# Patient Record
Sex: FEMALE | Race: OTHER | HISPANIC OR LATINO | URBAN - METROPOLITAN AREA
[De-identification: names, ages, dates, MRNs, and addresses within clinical notes are randomized per-mention and may not be internally consistent; named-entity substitution may affect disease eponyms.]

---

## 2022-07-09 ENCOUNTER — TELEPHONE ENCOUNTER (OUTPATIENT)
Dept: URBAN - METROPOLITAN AREA CLINIC 121 | Facility: CLINIC | Age: 72
End: 2022-07-09

## 2022-07-09 RX ORDER — FAMOTIDINE 10 MG
TABLET ORAL TWICE A DAY
Refills: 0 | OUTPATIENT
Start: 2019-08-22 | End: 2019-11-12

## 2022-07-09 RX ORDER — INSULIN LISPRO 100 [IU]/ML
40 UNIT 3 TIMES A DAY INJECTION, SOLUTION INTRAVENOUS; SUBCUTANEOUS
Refills: 0 | OUTPATIENT
Start: 2019-08-22 | End: 2019-08-22

## 2022-07-09 RX ORDER — CLOPIDOGREL 75 MG/1
TABLET ORAL
Refills: 0 | OUTPATIENT
Start: 2019-06-17 | End: 2019-06-25

## 2022-07-09 RX ORDER — LINACLOTIDE 145 UG/1
CAPSULE, GELATIN COATED ORAL
Refills: 0 | OUTPATIENT
Start: 2019-08-22 | End: 2019-11-12

## 2022-07-09 RX ORDER — ARIPIPRAZOLE 10 MG/1
TABLET ORAL
Refills: 0 | OUTPATIENT
Start: 2019-06-25 | End: 2019-08-22

## 2022-07-09 RX ORDER — INSULIN DETEMIR 100 [IU]/ML
40 UNIT HS INJECTION, SOLUTION SUBCUTANEOUS
Refills: 0 | OUTPATIENT
Start: 2019-11-12 | End: 2020-02-10

## 2022-07-09 RX ORDER — LINACLOTIDE 145 UG/1
CAPSULE, GELATIN COATED ORAL
Refills: 0 | OUTPATIENT
Start: 2019-06-25 | End: 2019-08-22

## 2022-07-09 RX ORDER — INSULIN LISPRO 100 [IU]/ML
40 UNIT 3 TIMES A DAY INJECTION, SOLUTION INTRAVENOUS; SUBCUTANEOUS
Refills: 0 | OUTPATIENT
Start: 2019-01-28 | End: 2019-06-25

## 2022-07-09 RX ORDER — INSULIN GLARGINE 300 U/ML
20 UNIT DAILY INJECTION, SOLUTION SUBCUTANEOUS
Refills: 0 | OUTPATIENT
Start: 2019-08-22 | End: 2019-08-22

## 2022-07-09 RX ORDER — ARIPIPRAZOLE 10 MG/1
TABLET ORAL
Refills: 0 | OUTPATIENT
Start: 2019-08-22 | End: 2019-11-12

## 2022-07-09 RX ORDER — INSULIN DETEMIR 100 [IU]/ML
40 UNIT HS INJECTION, SOLUTION SUBCUTANEOUS
Refills: 0 | OUTPATIENT
Start: 2019-01-28 | End: 2019-08-22

## 2022-07-09 RX ORDER — METFORMIN HCL 500 MG/1
TABLET ORAL THREE TIMES A DAY
Refills: 0 | OUTPATIENT
Start: 2019-04-30 | End: 2019-06-25

## 2022-07-09 RX ORDER — INSULIN LISPRO 100 [IU]/ML
20 UNIT QID INJECTION, SOLUTION INTRAVENOUS; SUBCUTANEOUS
Refills: 0 | OUTPATIENT
Start: 2019-08-22 | End: 2019-11-12

## 2022-07-09 RX ORDER — DULOXETINE 30 MG/1
CAPSULE, DELAYED RELEASE PELLETS ORAL
Refills: 0 | OUTPATIENT
Start: 2019-06-25 | End: 2019-08-22

## 2022-07-09 RX ORDER — PREDNISOLONE ACETATE 10 MG/ML
SUSPENSION/ DROPS OPHTHALMIC
Refills: 0 | OUTPATIENT
Start: 2019-06-12 | End: 2019-11-12

## 2022-07-09 RX ORDER — INSULIN DETEMIR 100 [IU]/ML
40 UNIT HS INJECTION, SOLUTION SUBCUTANEOUS
Refills: 0 | OUTPATIENT
Start: 2019-08-22 | End: 2019-11-12

## 2022-07-09 RX ORDER — BROMFENAC SODIUM 0.7 MG/ML
SOLUTION/ DROPS OPHTHALMIC
Refills: 0 | OUTPATIENT
Start: 2019-06-12 | End: 2019-11-12

## 2022-07-09 RX ORDER — INSULIN GLARGINE 300 U/ML
20 UNIT DAILY INJECTION, SOLUTION SUBCUTANEOUS
Refills: 0 | OUTPATIENT
Start: 2019-06-25 | End: 2019-08-22

## 2022-07-09 RX ORDER — FAMOTIDINE 10 MG
TABLET ORAL TWICE A DAY
Refills: 0 | OUTPATIENT
Start: 2019-03-26 | End: 2019-06-25

## 2022-07-09 RX ORDER — LINACLOTIDE 145 UG/1
CAPSULE, GELATIN COATED ORAL
Refills: 0 | OUTPATIENT
Start: 2019-05-22 | End: 2019-06-25

## 2022-07-09 RX ORDER — FAMOTIDINE 10 MG
TABLET ORAL TWICE A DAY
Refills: 0 | OUTPATIENT
Start: 2019-06-25 | End: 2019-08-22

## 2022-07-09 RX ORDER — INSULIN LISPRO 100 [IU]/ML
20 UNIT QID INJECTION, SOLUTION INTRAVENOUS; SUBCUTANEOUS
Refills: 0 | OUTPATIENT
Start: 2019-01-28 | End: 2019-08-22

## 2022-07-09 RX ORDER — PANTOPRAZOLE SODIUM 40 MG/1
TABLET, DELAYED RELEASE ORAL
Refills: 0 | OUTPATIENT
Start: 2019-06-25 | End: 2019-08-22

## 2022-07-09 RX ORDER — DULOXETINE 30 MG/1
CAPSULE, DELAYED RELEASE PELLETS ORAL
Refills: 0 | OUTPATIENT
Start: 2019-08-22 | End: 2019-11-12

## 2022-07-09 RX ORDER — CLOPIDOGREL 75 MG/1
TABLET ORAL
Refills: 0 | OUTPATIENT
Start: 2019-08-22 | End: 2019-11-12

## 2022-07-09 RX ORDER — INSULIN LISPRO 100 [IU]/ML
40 UNIT 3 TIMES A DAY INJECTION, SOLUTION INTRAVENOUS; SUBCUTANEOUS
Refills: 0 | OUTPATIENT
Start: 2019-06-25 | End: 2019-08-22

## 2022-07-09 RX ORDER — PANTOPRAZOLE SODIUM 40 MG/1
TABLET, DELAYED RELEASE ORAL
Refills: 0 | OUTPATIENT
Start: 2019-08-22 | End: 2019-11-12

## 2022-07-09 RX ORDER — DULOXETINE 30 MG/1
CAPSULE, DELAYED RELEASE PELLETS ORAL
Refills: 0 | OUTPATIENT
Start: 2019-04-18 | End: 2019-06-25

## 2022-07-09 RX ORDER — CLOPIDOGREL 75 MG/1
TABLET ORAL
Refills: 0 | OUTPATIENT
Start: 2019-06-25 | End: 2019-08-22

## 2022-07-09 RX ORDER — METFORMIN HCL 500 MG/1
TABLET ORAL THREE TIMES A DAY
Refills: 0 | OUTPATIENT
Start: 2019-06-25 | End: 2019-08-22

## 2022-07-09 RX ORDER — METFORMIN HCL 500 MG/1
TABLET ORAL THREE TIMES A DAY
Refills: 0 | OUTPATIENT
Start: 2019-08-22 | End: 2019-11-12

## 2022-07-10 ENCOUNTER — TELEPHONE ENCOUNTER (OUTPATIENT)
Dept: URBAN - METROPOLITAN AREA CLINIC 121 | Facility: CLINIC | Age: 72
End: 2022-07-10

## 2022-07-10 RX ORDER — PANTOPRAZOLE SODIUM 40 MG/1
ONCE A DAY TABLET, DELAYED RELEASE ORAL ONCE A DAY
Refills: 2 | Status: ACTIVE | COMMUNITY
Start: 2020-02-10

## 2022-07-10 RX ORDER — FAMOTIDINE 10 MG
TABLET ORAL TWICE A DAY
Refills: 0 | Status: ACTIVE | COMMUNITY
Start: 2019-11-12

## 2022-07-10 RX ORDER — LINACLOTIDE 145 UG/1
CAPSULE, GELATIN COATED ORAL
Refills: 0 | Status: ACTIVE | COMMUNITY
Start: 2019-11-12

## 2022-07-10 RX ORDER — METFORMIN HCL 500 MG/1
TABLET ORAL THREE TIMES A DAY
Refills: 0 | Status: ACTIVE | COMMUNITY
Start: 2019-11-12

## 2022-07-10 RX ORDER — SUCRALFATE 1 G/1
THREE TIMES A DAY TABLET ORAL THREE TIMES A DAY
Refills: 2 | Status: ACTIVE | COMMUNITY
Start: 2020-02-10

## 2022-07-10 RX ORDER — BROMFENAC SODIUM 0.7 MG/ML
SOLUTION/ DROPS OPHTHALMIC
Refills: 0 | Status: ACTIVE | COMMUNITY
Start: 2019-11-12

## 2022-07-10 RX ORDER — ARIPIPRAZOLE 10 MG/1
TABLET ORAL
Refills: 0 | Status: ACTIVE | COMMUNITY
Start: 2019-11-12

## 2022-07-10 RX ORDER — PANTOPRAZOLE SODIUM 40 MG/1
TABLET, DELAYED RELEASE ORAL
Refills: 0 | Status: ACTIVE | COMMUNITY
Start: 2019-11-12

## 2022-07-10 RX ORDER — PANTOPRAZOLE 20 MG/1
ONCE A DAY TABLET, DELAYED RELEASE ORAL ONCE A DAY
Refills: 0 | Status: ACTIVE | COMMUNITY
Start: 2019-11-12

## 2022-07-10 RX ORDER — INSULIN LISPRO 100 [IU]/ML
40 UNIT 3 TIMES A DAY INJECTION, SOLUTION INTRAVENOUS; SUBCUTANEOUS
Refills: 0 | Status: ACTIVE | COMMUNITY
Start: 2019-11-12

## 2022-07-10 RX ORDER — INSULIN DETEMIR 100 [IU]/ML
INJECTION, SOLUTION SUBCUTANEOUS TWICE A DAY
Refills: 0 | Status: ACTIVE | COMMUNITY
Start: 2020-02-06

## 2022-07-10 RX ORDER — DULOXETINE 30 MG/1
CAPSULE, DELAYED RELEASE PELLETS ORAL
Refills: 0 | Status: ACTIVE | COMMUNITY
Start: 2019-11-12

## 2022-07-10 RX ORDER — ASPIRIN 81 MG/1
TABLET, COATED ORAL
Refills: 0 | Status: ACTIVE | COMMUNITY
Start: 2019-11-12

## 2022-07-30 ENCOUNTER — TELEPHONE ENCOUNTER (OUTPATIENT)
Age: 72
End: 2022-07-30

## 2022-07-31 ENCOUNTER — TELEPHONE ENCOUNTER (OUTPATIENT)
Age: 72
End: 2022-07-31

## 2023-04-19 ENCOUNTER — OFFICE VISIT (OUTPATIENT)
Dept: URBAN - METROPOLITAN AREA CLINIC 60 | Facility: CLINIC | Age: 73
End: 2023-04-19
Payer: COMMERCIAL

## 2023-04-19 ENCOUNTER — DASHBOARD ENCOUNTERS (OUTPATIENT)
Age: 73
End: 2023-04-19

## 2023-04-19 VITALS
BODY MASS INDEX: 31.18 KG/M2 | DIASTOLIC BLOOD PRESSURE: 78 MMHG | OXYGEN SATURATION: 98 % | SYSTOLIC BLOOD PRESSURE: 128 MMHG | WEIGHT: 176 LBS | HEIGHT: 63 IN | HEART RATE: 78 BPM | RESPIRATION RATE: 20 BRPM | TEMPERATURE: 97 F

## 2023-04-19 DIAGNOSIS — K59.04 CHRONIC IDIOPATHIC CONSTIPATION: ICD-10-CM

## 2023-04-19 PROBLEM — 82934008: Status: ACTIVE | Noted: 2023-04-19

## 2023-04-19 PROCEDURE — 99204 OFFICE O/P NEW MOD 45 MIN: CPT | Performed by: NURSE PRACTITIONER

## 2023-04-19 RX ORDER — INSULIN LISPRO 100 [IU]/ML
AS DIRECTED INJECTION, SOLUTION INTRAVENOUS; SUBCUTANEOUS
Status: ACTIVE | COMMUNITY

## 2023-04-19 RX ORDER — LINACLOTIDE 145 UG/1
1 CAPSULE AT LEAST 30 MINUTES BEFORE THE FIRST MEAL OF THE DAY ON AN EMPTY STOMACH CAPSULE, GELATIN COATED ORAL ONCE A DAY
Qty: 90 CAPSULES | Refills: 0 | OUTPATIENT
Start: 2023-04-19 | End: 2023-07-18

## 2023-04-19 RX ORDER — INSULIN DETEMIR 100 [IU]/ML
INJECTION, SOLUTION SUBCUTANEOUS TWICE A DAY
Refills: 0 | Status: ACTIVE | COMMUNITY
Start: 2020-02-06

## 2023-04-19 RX ORDER — DULOXETINE 30 MG/1
CAPSULE, DELAYED RELEASE PELLETS ORAL
Refills: 0 | Status: ACTIVE | COMMUNITY
Start: 2019-11-12

## 2023-04-19 RX ORDER — LISINOPRIL 5 MG/1
1 TABLET TABLET ORAL ONCE A DAY
Status: ACTIVE | COMMUNITY

## 2023-04-19 RX ORDER — METFORMIN HCL 500 MG/1
TABLET ORAL THREE TIMES A DAY
Refills: 0 | Status: ACTIVE | COMMUNITY
Start: 2019-11-12

## 2023-04-19 RX ORDER — LINACLOTIDE 145 UG/1
CAPSULE, GELATIN COATED ORAL
Refills: 0 | Status: ACTIVE | COMMUNITY
Start: 2019-11-12

## 2023-04-19 NOTE — HPI-TODAY'S VISIT:
Patient comes with chronic burping, was diagnosed with gastritis, is on treatment with ranitidine, with no improvement. Patient comes from [place] and has a long list of medication, she is taking Plavix, and she does not know well why she is taking it, she told me her "blood is thick" No cardiovascular event in the past. Patient had ED and colonoscopy a rond 5 years ago as per patient the only findings was gastritis. Will need records. Discussion [Use for free text]. Discussed dietary restrictions pertaining to Gastritis Information sheet reviewed and a copy provided.   8/19 Patient here today complaining of symptoms of reflux and gastritis, she said diet and treatment with PPI did not work for her. Also, for her surveillance colonoscopy. Plan: Patient will have EGD also colonoscopy.  11/19 Patient colonoscopy with evidence of one 5 mm hyperplastic polyp in the transverse colon, a 5 mm hyperplastic polyp into the rectum, mild diverticular disease in the sigmoid colon, and internal hemorrhoids. EGD showed evidence of a small H hernia. Duodenal mucosa normal. Antrum, body stomach biopsy with no specific pathologic alteration, negative for H. pylori.  Lower third esophageal mucosa with features of reflux, negative for barrette esophagus. Patient complaining of: Reflux symptoms, burping all day, dyspepsia and slow digestion.  Patient with medical history of diabetic. Plan: Patient will do diet for gastritis and reflux, will DC ranitidine, decrease dose of pantoprazole to 20 mg daily.  Will have gastric empty studies, and we have lipase, amylase, pancreatic elastase laboratories.  2/20 Patient with gastric empty studies is normal, pancreatic elastase, amylase, lipase, and liver enzymes are normal. Today in good general state, she said still having symptoms of reflux and gastritis, will add Carafate 1 g 3 times a day for 2 weeks to continue 1 g twice a day for 8 weeks.  Continue pantoprazole follow-up 40 mg once a day. 4/23 Patient here today complaining of constipation, diffuse abdominal discomfort and increasing gases, patient last colonoscopy was done 4 years ago with a recall in 5 years.  The procedure shows a small 5 mm hyperplastic polyp into the rectum.  Patient constipation is chronic she states over-the-counter laxative are not working for her anymore. She will increase fiber and fluid intake and will do a trial with Linzess 145 mcg daily.